# Patient Record
Sex: MALE | Race: BLACK OR AFRICAN AMERICAN | URBAN - METROPOLITAN AREA
[De-identification: names, ages, dates, MRNs, and addresses within clinical notes are randomized per-mention and may not be internally consistent; named-entity substitution may affect disease eponyms.]

---

## 2023-06-21 ENCOUNTER — OFFICE (OUTPATIENT)
Dept: URBAN - METROPOLITAN AREA CLINIC 35 | Facility: CLINIC | Age: 48
Setting detail: OPHTHALMOLOGY
End: 2023-06-21
Payer: COMMERCIAL

## 2023-06-21 DIAGNOSIS — H10.45: ICD-10-CM

## 2023-06-21 DIAGNOSIS — H02.401: ICD-10-CM

## 2023-06-21 PROCEDURE — 92002 INTRM OPH EXAM NEW PATIENT: CPT | Performed by: OPHTHALMOLOGY

## 2023-06-21 ASSESSMENT — LID EXAM ASSESSMENTS: OD_EDEMA: RLL RUL 3+

## 2023-06-21 ASSESSMENT — KERATOMETRY
OD_K1POWER_DIOPTERS: 44.75
OS_K2POWER_DIOPTERS: 47.25
OD_K2POWER_DIOPTERS: 47.50
OS_K1POWER_DIOPTERS: 45.00
OS_AXISANGLE_DEGREES: 090
OD_AXISANGLE_DEGREES: 108

## 2023-06-21 ASSESSMENT — REFRACTION_AUTOREFRACTION
OD_CYLINDER: +3.75
OD_AXIS: 102
OS_SPHERE: -11.50
OD_SPHERE: -13.50
OS_CYLINDER: +2.25
OS_AXIS: 095

## 2023-06-21 ASSESSMENT — CONFRONTATIONAL VISUAL FIELD TEST (CVF)
OD_FINDINGS: FULL
OS_FINDINGS: FULL

## 2023-06-21 ASSESSMENT — VISUAL ACUITY
OS_BCVA: 20/30-2
OD_BCVA: 20/30+2

## 2023-06-21 ASSESSMENT — SPHEQUIV_DERIVED
OD_SPHEQUIV: -11.625
OS_SPHEQUIV: -10.375

## 2023-06-21 ASSESSMENT — TONOMETRY: OD_IOP_MMHG: 20

## 2023-06-21 ASSESSMENT — AXIALLENGTH_DERIVED
OD_AL: 27.79
OS_AL: 27.1317

## 2023-06-21 ASSESSMENT — LID POSITION - PTOSIS: OD_PTOSIS: RUL

## 2023-06-26 ENCOUNTER — OFFICE (OUTPATIENT)
Dept: URBAN - METROPOLITAN AREA CLINIC 35 | Facility: CLINIC | Age: 48
Setting detail: OPHTHALMOLOGY
End: 2023-06-26
Payer: COMMERCIAL

## 2023-06-26 DIAGNOSIS — H02.401: ICD-10-CM

## 2023-06-26 DIAGNOSIS — H02.89: ICD-10-CM

## 2023-06-26 DIAGNOSIS — B97.7: ICD-10-CM

## 2023-06-26 DIAGNOSIS — H10.45: ICD-10-CM

## 2023-06-26 PROCEDURE — 99213 OFFICE O/P EST LOW 20 MIN: CPT | Performed by: OPHTHALMOLOGY

## 2023-06-26 ASSESSMENT — KERATOMETRY
OS_K2POWER_DIOPTERS: 47.25
OD_K1POWER_DIOPTERS: 44.75
OS_K1POWER_DIOPTERS: 45.00
OD_K2POWER_DIOPTERS: 47.50
OD_AXISANGLE_DEGREES: 108
OS_AXISANGLE_DEGREES: 090

## 2023-06-26 ASSESSMENT — REFRACTION_AUTOREFRACTION
OD_AXIS: 100
OS_CYLINDER: +1.75
OS_AXIS: 090
OD_CYLINDER: +4.00
OS_SPHERE: -10.75
OD_SPHERE: -13.75

## 2023-06-26 ASSESSMENT — TONOMETRY
OS_IOP_MMHG: 18
OD_IOP_MMHG: 18

## 2023-06-26 ASSESSMENT — REFRACTION_CURRENTRX
OS_AXIS: 090
OD_SPHERE: -12.25
OS_OVR_VA: 20/
OS_SPHERE: -11.75
OS_CYLINDER: +1.75
OD_VPRISM_DIRECTION: SV
OD_OVR_VA: 20/
OD_AXIS: 105
OD_CYLINDER: +2.25
OS_VPRISM_DIRECTION: SV

## 2023-06-26 ASSESSMENT — LID EXAM ASSESSMENTS
OS_COMMENTS: OS
OD_COMMENTS: MEIBOMITIS OD&GT
OD_EDEMA: RLL RUL 3+
OS_COMMENTS: MEIBOMITIS OD&GT
OD_MEIBOMITIS: 3+ 4+
OS_MEIBOMITIS: 3+
OD_COMMENTS: OS

## 2023-06-26 ASSESSMENT — AXIALLENGTH_DERIVED
OD_AL: 27.86
OS_AL: 26.8765

## 2023-06-26 ASSESSMENT — CONFRONTATIONAL VISUAL FIELD TEST (CVF)
OD_FINDINGS: FULL
OS_FINDINGS: FULL

## 2023-06-26 ASSESSMENT — VISUAL ACUITY
OD_BCVA: 20/30-
OS_BCVA: 20/20-3

## 2023-06-26 ASSESSMENT — SPHEQUIV_DERIVED
OD_SPHEQUIV: -11.75
OS_SPHEQUIV: -9.875

## 2023-06-26 ASSESSMENT — LID POSITION - PTOSIS: OD_PTOSIS: RUL

## 2023-07-03 ENCOUNTER — OFFICE (OUTPATIENT)
Dept: URBAN - METROPOLITAN AREA CLINIC 35 | Facility: CLINIC | Age: 48
Setting detail: OPHTHALMOLOGY
End: 2023-07-03
Payer: COMMERCIAL

## 2023-07-03 DIAGNOSIS — H02.89: ICD-10-CM

## 2023-07-03 DIAGNOSIS — H10.45: ICD-10-CM

## 2023-07-03 PROBLEM — S00.261X ABRASION, EYELID/PERIOCULAR: Status: ACTIVE | Noted: 2023-06-21

## 2023-07-03 PROBLEM — H02.401 PTOSIS OF EYELID, UNSPECIFIED; RIGHT EYE: Status: ACTIVE | Noted: 2023-06-26

## 2023-07-03 PROBLEM — B97.7 PAPILLOMAVIRUS AS THE CAUSE OF DISEASES CLASSIFIED ELSEWHERE: Status: ACTIVE | Noted: 2023-06-26

## 2023-07-03 PROCEDURE — 99213 OFFICE O/P EST LOW 20 MIN: CPT | Performed by: OPHTHALMOLOGY

## 2023-07-03 ASSESSMENT — KERATOMETRY
OS_K2POWER_DIOPTERS: 47.00
OS_K1POWER_DIOPTERS: 45.00
OS_AXISANGLE_DEGREES: 085
OD_K2POWER_DIOPTERS: 47.75
OD_K1POWER_DIOPTERS: 45.25
OD_AXISANGLE_DEGREES: 098

## 2023-07-03 ASSESSMENT — LID EXAM ASSESSMENTS
OS_COMMENTS: MEIBOMITIS OD&GT
OD_COMMENTS: OS
OD_MEIBOMITIS: 3+ 4+
OD_EDEMA: RLL RUL 3+
OD_COMMENTS: MEIBOMITIS OD&GT
OS_COMMENTS: OS
OS_MEIBOMITIS: 3+

## 2023-07-03 ASSESSMENT — VISUAL ACUITY
OS_BCVA: 20/20
OD_BCVA: 20/20-1

## 2023-07-03 ASSESSMENT — REFRACTION_CURRENTRX
OD_AXIS: 105
OS_VPRISM_DIRECTION: SV
OD_SPHERE: -12.25
OS_CYLINDER: +1.75
OD_VPRISM_DIRECTION: SV
OS_SPHERE: -11.75
OD_CYLINDER: +2.25
OS_AXIS: 090
OS_OVR_VA: 20/
OD_OVR_VA: 20/

## 2023-07-03 ASSESSMENT — REFRACTION_AUTOREFRACTION
OS_SPHERE: -11.50
OS_CYLINDER: +2.00
OD_CYLINDER: +2.75
OD_AXIS: 101
OS_AXIS: 092
OD_SPHERE: -12.75

## 2023-07-03 ASSESSMENT — LID POSITION - PTOSIS: OD_PTOSIS: RUL

## 2023-07-03 ASSESSMENT — SPHEQUIV_DERIVED
OD_SPHEQUIV: -11.375
OS_SPHEQUIV: -10.5

## 2023-07-03 ASSESSMENT — CONFRONTATIONAL VISUAL FIELD TEST (CVF)
OS_FINDINGS: FULL
OD_FINDINGS: FULL

## 2023-07-03 ASSESSMENT — AXIALLENGTH_DERIVED
OS_AL: 27.2574
OD_AL: 27.47

## 2024-08-01 ENCOUNTER — DOCTOR'S OFFICE (OUTPATIENT)
Age: 49
Setting detail: OPHTHALMOLOGY
End: 2024-08-01
Payer: COMMERCIAL

## 2024-08-01 DIAGNOSIS — H52.7: ICD-10-CM

## 2024-08-01 DIAGNOSIS — H52.4: ICD-10-CM

## 2024-08-01 DIAGNOSIS — H02.89: ICD-10-CM

## 2024-08-01 DIAGNOSIS — H52.13: ICD-10-CM

## 2024-08-01 PROCEDURE — 92015 DETERMINE REFRACTIVE STATE: CPT | Performed by: OPHTHALMOLOGY

## 2024-08-01 PROCEDURE — 99214 OFFICE O/P EST MOD 30 MIN: CPT | Performed by: OPHTHALMOLOGY

## 2024-08-01 ASSESSMENT — LID EXAM ASSESSMENTS
OD_EDEMA: ABSENT
OS_MEIBOMITIS: 3+
OD_COMMENTS: MEIBOMITIS OD&GT
OD_COMMENTS: OS
OS_COMMENTS: MEIBOMITIS OD&GT
OD_MEIBOMITIS: 3+ 4+
OS_COMMENTS: OS

## 2024-08-01 ASSESSMENT — CONFRONTATIONAL VISUAL FIELD TEST (CVF)
OS_FINDINGS: FULL
OD_FINDINGS: FULL

## 2024-08-01 ASSESSMENT — LID POSITION - PTOSIS: OD_PTOSIS: RUL

## 2024-09-05 ENCOUNTER — NON-APPOINTMENT (OUTPATIENT)
Age: 49
End: 2024-09-05

## 2024-09-09 ENCOUNTER — NON-APPOINTMENT (OUTPATIENT)
Age: 49
End: 2024-09-09

## 2024-09-10 ENCOUNTER — NON-APPOINTMENT (OUTPATIENT)
Age: 49
End: 2024-09-10

## 2024-09-10 ENCOUNTER — APPOINTMENT (OUTPATIENT)
Dept: CARDIOLOGY | Facility: CLINIC | Age: 49
End: 2024-09-10
Payer: COMMERCIAL

## 2024-09-10 VITALS
BODY MASS INDEX: 31.92 KG/M2 | HEART RATE: 97 BPM | WEIGHT: 223 LBS | OXYGEN SATURATION: 98 % | DIASTOLIC BLOOD PRESSURE: 80 MMHG | SYSTOLIC BLOOD PRESSURE: 138 MMHG | HEIGHT: 70 IN

## 2024-09-10 DIAGNOSIS — R03.0 ELEVATED BLOOD-PRESSURE READING, W/OUT DIAGNOSIS OF HYPERTENSION: ICD-10-CM

## 2024-09-10 DIAGNOSIS — Z85.47 PERSONAL HISTORY OF MALIGNANT NEOPLASM OF TESTIS: ICD-10-CM

## 2024-09-10 DIAGNOSIS — R60.0 LOCALIZED EDEMA: ICD-10-CM

## 2024-09-10 DIAGNOSIS — F31.9 BIPOLAR DISORDER, UNSPECIFIED: ICD-10-CM

## 2024-09-10 PROBLEM — Z00.00 ENCOUNTER FOR PREVENTIVE HEALTH EXAMINATION: Status: ACTIVE | Noted: 2024-09-10

## 2024-09-10 PROCEDURE — 93000 ELECTROCARDIOGRAM COMPLETE: CPT

## 2024-09-10 PROCEDURE — 99204 OFFICE O/P NEW MOD 45 MIN: CPT

## 2024-09-10 RX ORDER — FUROSEMIDE 20 MG/1
20 TABLET ORAL DAILY
Qty: 30 | Refills: 3 | Status: ACTIVE | COMMUNITY
Start: 2024-09-10 | End: 1900-01-01

## 2024-09-10 RX ORDER — LITHIUM CARBONATE 300 MG/1
TABLET ORAL
Refills: 0 | Status: ACTIVE | COMMUNITY

## 2024-09-10 RX ORDER — DIVALPROEX SODIUM 500 MG/1
500 TABLET, DELAYED RELEASE ORAL
Refills: 0 | Status: ACTIVE | COMMUNITY

## 2024-09-10 RX ORDER — POTASSIUM CHLORIDE 750 MG/1
10 TABLET, FILM COATED, EXTENDED RELEASE ORAL DAILY
Qty: 30 | Refills: 3 | Status: ACTIVE | COMMUNITY
Start: 2024-09-10 | End: 1900-01-01

## 2024-09-10 RX ORDER — OLANZAPINE AND SAMIDORPHAN L-MALATE 20; 10 MG/1; MG/1
TABLET, FILM COATED ORAL
Refills: 0 | Status: ACTIVE | COMMUNITY

## 2024-09-10 NOTE — HISTORY OF PRESENT ILLNESS
[FreeTextEntry1] : 49-year-old man gives a history of bipolar disorder.  He gives a history of testicular cancer with orchiectomy and chemotherapy about 10 years ago.  He presents now with complaint of 1 month of generalized edema predominantly in the legs.  He has difficulty getting his shoes on.  He has no shortness of breath chest pain orthopnea palpitations.  He gives no personal cardiac history.  He denies hypertension diabetes heart murmur rheumatic fever.

## 2024-09-10 NOTE — DISCUSSION/SUMMARY
[FreeTextEntry1] : Discussed with patient.  He will be seeing oncologist and primary care doctor shortly for evaluation, next week.  Will obtain lab testing for renal function and BNP, initiate diuretic and see him back for echo and follow-up discussion.  Discussed multiple possible etiologies for his condition.  Counseled on low-sodium diet and handout given. [EKG obtained to assist in diagnosis and management of assessed problem(s)] : EKG obtained to assist in diagnosis and management of assessed problem(s)

## 2024-09-10 NOTE — ASSESSMENT
[FreeTextEntry1] : New onset edema in this 49-year-old man with history of bipolar disorder and testicular cancer, chemotherapy.

## 2024-09-12 LAB
ALBUMIN SERPL ELPH-MCNC: 4.6 G/DL
ALP BLD-CCNC: 79 U/L
ALT SERPL-CCNC: 75 U/L
ANION GAP SERPL CALC-SCNC: 15 MMOL/L
AST SERPL-CCNC: 44 U/L
BILIRUB SERPL-MCNC: 0.2 MG/DL
BUN SERPL-MCNC: 22 MG/DL
CALCIUM SERPL-MCNC: 10.4 MG/DL
CHLORIDE SERPL-SCNC: 105 MMOL/L
CO2 SERPL-SCNC: 24 MMOL/L
CREAT SERPL-MCNC: 1.4 MG/DL
EGFR: 61 ML/MIN/1.73M2
GLUCOSE SERPL-MCNC: 91 MG/DL
MAGNESIUM SERPL-MCNC: 1.9 MG/DL
NT-PROBNP SERPL-MCNC: <36 PG/ML
POTASSIUM SERPL-SCNC: 4.5 MMOL/L
PROT SERPL-MCNC: 7.2 G/DL
SODIUM SERPL-SCNC: 145 MMOL/L

## 2024-10-29 ENCOUNTER — APPOINTMENT (OUTPATIENT)
Dept: CARDIOLOGY | Facility: CLINIC | Age: 49
End: 2024-10-29
Payer: COMMERCIAL

## 2024-10-29 VITALS
OXYGEN SATURATION: 98 % | DIASTOLIC BLOOD PRESSURE: 60 MMHG | BODY MASS INDEX: 28.35 KG/M2 | SYSTOLIC BLOOD PRESSURE: 120 MMHG | WEIGHT: 198 LBS | HEART RATE: 71 BPM | HEIGHT: 70 IN

## 2024-10-29 DIAGNOSIS — R79.89 OTHER SPECIFIED ABNORMAL FINDINGS OF BLOOD CHEMISTRY: ICD-10-CM

## 2024-10-29 PROCEDURE — 93306 TTE W/DOPPLER COMPLETE: CPT

## 2024-10-29 PROCEDURE — 99214 OFFICE O/P EST MOD 30 MIN: CPT

## 2024-11-26 ENCOUNTER — NON-APPOINTMENT (OUTPATIENT)
Age: 49
End: 2024-11-26

## 2025-02-02 ENCOUNTER — NON-APPOINTMENT (OUTPATIENT)
Age: 50
End: 2025-02-02

## 2025-06-30 ENCOUNTER — NON-APPOINTMENT (OUTPATIENT)
Age: 50
End: 2025-06-30